# Patient Record
Sex: FEMALE | Employment: OTHER | URBAN - METROPOLITAN AREA
[De-identification: names, ages, dates, MRNs, and addresses within clinical notes are randomized per-mention and may not be internally consistent; named-entity substitution may affect disease eponyms.]

---

## 2024-03-19 PROCEDURE — 0760T DGTZ GLS MCRSCP SL IMM 1ST: CPT

## 2024-03-19 PROCEDURE — 88360 TUMOR IMMUNOHISTOCHEM/MANUAL: CPT | Performed by: PATHOLOGY

## 2024-03-19 PROCEDURE — 0763T DGTZ GLS MCRSCP MPHMTRC ALYS: CPT

## 2024-03-19 PROCEDURE — 88342 IMHCHEM/IMCYTCHM 1ST ANTB: CPT | Performed by: PATHOLOGY

## 2024-03-19 PROCEDURE — 88341 IMHCHEM/IMCYTCHM EA ADD ANTB: CPT | Performed by: PATHOLOGY

## 2024-03-19 PROCEDURE — 88341 IMHCHEM/IMCYTCHM EA ADD ANTB: CPT

## 2024-03-19 PROCEDURE — 88342 IMHCHEM/IMCYTCHM 1ST ANTB: CPT

## 2024-03-19 PROCEDURE — 0753T DGTZ GLS MCRSCP SLD LEVEL IV: CPT

## 2024-03-19 PROCEDURE — 88305 TISSUE EXAM BY PATHOLOGIST: CPT

## 2024-03-19 PROCEDURE — 88305 TISSUE EXAM BY PATHOLOGIST: CPT | Performed by: PATHOLOGY

## 2024-03-19 PROCEDURE — 88360 TUMOR IMMUNOHISTOCHEM/MANUAL: CPT

## 2024-03-20 ENCOUNTER — LAB REQUISITION (OUTPATIENT)
Dept: LAB | Facility: HOSPITAL | Age: 89
End: 2024-03-20
Payer: MEDICARE

## 2024-03-20 DIAGNOSIS — N63.11 UNSPECIFIED LUMP IN THE RIGHT BREAST, UPPER OUTER QUADRANT: ICD-10-CM

## 2024-04-02 LAB
CLINICAL SIG UPDATED INFO: NORMAL
LAB AP ASR DISCLAIMER: NORMAL
LABORATORY COMMENT REPORT: NORMAL
PATH REPORT.ADDENDUM SPEC: NORMAL
PATH REPORT.FINAL DX SPEC: NORMAL
PATH REPORT.GROSS SPEC: NORMAL
PATH REPORT.TOTAL CANCER: NORMAL

## 2024-04-10 NOTE — PROGRESS NOTES
[facility] Heart and Vascular Electrophysiology    Patient Name: La Ospina  Patient : 1929    Referred for  Syncope/pauses on monitor    La Ospina is a 89 y.o. year old female patient with    Chronic afib- not anticoagulated (has declined since diagnosed long ago)  Hypertension  CKD III  Breast cancer-testing in progress  Syncope- Patient had a fall vs syncopal episode 2024. This is was an unwitnessed fall. Patient lives with her daughter. The daughter reports she heard her mom fall. Patient was sitting on the side of the bed and ending up falling hitting her face, breaking her nose. Patient had a 14 day monitor worn -3/6/2024- Primary rhythm was atrial fib/flutter. Avg hr was 71 bpm. Minimum hr 36 bpm Max hr 140 bpm. Afib/flutter 100%. Pauses  events longest 3 sec.    Patient here with her daughters for evaluation of syncope/fall. Patient does have some memory issues. Patient and family denies any prior falls. Patient denies lightheadedness or dizziness. They are unsure how long she has been in persistent atrial fibrillation and patient does not recall ever being on OAC.     Past Medical History:  She has no past medical history on file.    Past Surgical History:  She has a past surgical history that includes Hysterectomy.      Social History:  She reports that she has never smoked. She has never used smokeless tobacco. She reports that she does not currently use alcohol. She reports that she does not use drugs.    Family History:  No family history on file.     Allergies:  Codeine    Outpatient Medications:  Current Outpatient Medications   Medication Instructions    Cartia  mg, oral, Daily    cholecalciferol, vitamin D3, (VITAMIN D3 ORAL) oral    cyanocobalamin, vitamin B-12, (VITAMIN B-12 ORAL) oral    furosemide (LASIX) 40 mg, oral, Daily    lisinopril 20 mg tablet 1 tablet, oral, Daily RT    vit C/E/zinc/lutein/zeaxanthin (OCUVITE EYE HEALTH ORAL) oral        ROS:  A 14  "point review of systems was done and is negative other than as stated in HPI    Vitals:  Vitals:    04/11/24 1055   BP: 112/76   Pulse: 78   Weight: 81.6 kg (180 lb)   Height: 1.575 m (5' 2\")       Physical Exam  Constitutional:       Appearance: Normal appearance.   Cardiovascular:      Rate and Rhythm: Normal rate. Rhythm irregular.      Pulses: Normal pulses.      Heart sounds: Normal heart sounds.   Pulmonary:      Effort: Pulmonary effort is normal.      Breath sounds: Normal breath sounds.   Musculoskeletal:         General: Normal range of motion.      Right lower leg: No edema.      Left lower leg: No edema.   Skin:     General: Skin is warm and dry.   Neurological:      Mental Status: She is alert and oriented to person, place, and time.   Psychiatric:         Mood and Affect: Mood normal.          Labs:   No results found for: \"WBC\", \"HGB\", \"HCT\", \"PLT\", \"ALT\", \"AST\", \"NA\", \"K\", \"CL\", \"CREATININE\", \"BUN\", \"CO2\", \"TSH\", \"INR\", \"GLUF\"     Cardiac Testing:  ECG  No results found for this or any previous visit (from the past 4464 hour(s)).    Monitor Feb/March 2024  Atrial fibrillation average rate of 71 bpm ()  1917 pauses with longest of 3 seconds  <1% PVC burden    Assessment:   Fall/syncope: ECG today AF with rate of 78 bpm. Monitor with average heart rate of 71 bpm with pauses, longest 3 seconds. Unclear why patient fell or if she had syncopal episode. These pauses do not seem significant enough to warrant pacing but it may be reasonable to pursue long term monitoring.  We discussed ILR for long term monitoring as monitor did not show indication for pacing at this time. Patient and family agreeable to ILR. We will have patient continue with her breast cancer workup to see if she will need further imagining prior to ILR implant. We will stop lisinopril to reduce the chances of hypotension.     Longstanding persistent atrial fibrillation: appears to be predominantly rate controlled. We discussed OAC " with elevated CHADVasc score, including risks and benefits. We would recommend starting OAC if no contraindications after evaluation by PCP as patient reports occasional rectal bleeding.     Plan:  Stop lisinopril   Recommend OAC for stroke mitigation  ILR implant

## 2024-04-10 NOTE — H&P (VIEW-ONLY)
[facility] Heart and Vascular Electrophysiology    Patient Name: La Ospina  Patient : 1929    Referred for  Syncope/pauses on monitor    La Ospina is a 89 y.o. year old female patient with    Chronic afib- not anticoagulated (has declined since diagnosed long ago)  Hypertension  CKD III  Breast cancer-testing in progress  Syncope- Patient had a fall vs syncopal episode 2024. This is was an unwitnessed fall. Patient lives with her daughter. The daughter reports she heard her mom fall. Patient was sitting on the side of the bed and ending up falling hitting her face, breaking her nose. Patient had a 14 day monitor worn -3/6/2024- Primary rhythm was atrial fib/flutter. Avg hr was 71 bpm. Minimum hr 36 bpm Max hr 140 bpm. Afib/flutter 100%. Pauses  events longest 3 sec.    Patient here with her daughters for evaluation of syncope/fall. Patient does have some memory issues. Patient and family denies any prior falls. Patient denies lightheadedness or dizziness. They are unsure how long she has been in persistent atrial fibrillation and patient does not recall ever being on OAC.     Past Medical History:  She has no past medical history on file.    Past Surgical History:  She has a past surgical history that includes Hysterectomy.      Social History:  She reports that she has never smoked. She has never used smokeless tobacco. She reports that she does not currently use alcohol. She reports that she does not use drugs.    Family History:  No family history on file.     Allergies:  Codeine    Outpatient Medications:  Current Outpatient Medications   Medication Instructions    Cartia  mg, oral, Daily    cholecalciferol, vitamin D3, (VITAMIN D3 ORAL) oral    cyanocobalamin, vitamin B-12, (VITAMIN B-12 ORAL) oral    furosemide (LASIX) 40 mg, oral, Daily    lisinopril 20 mg tablet 1 tablet, oral, Daily RT    vit C/E/zinc/lutein/zeaxanthin (OCUVITE EYE HEALTH ORAL) oral        ROS:  A 14  "point review of systems was done and is negative other than as stated in HPI    Vitals:  Vitals:    04/11/24 1055   BP: 112/76   Pulse: 78   Weight: 81.6 kg (180 lb)   Height: 1.575 m (5' 2\")       Physical Exam  Constitutional:       Appearance: Normal appearance.   Cardiovascular:      Rate and Rhythm: Normal rate. Rhythm irregular.      Pulses: Normal pulses.      Heart sounds: Normal heart sounds.   Pulmonary:      Effort: Pulmonary effort is normal.      Breath sounds: Normal breath sounds.   Musculoskeletal:         General: Normal range of motion.      Right lower leg: No edema.      Left lower leg: No edema.   Skin:     General: Skin is warm and dry.   Neurological:      Mental Status: She is alert and oriented to person, place, and time.   Psychiatric:         Mood and Affect: Mood normal.          Labs:   No results found for: \"WBC\", \"HGB\", \"HCT\", \"PLT\", \"ALT\", \"AST\", \"NA\", \"K\", \"CL\", \"CREATININE\", \"BUN\", \"CO2\", \"TSH\", \"INR\", \"GLUF\"     Cardiac Testing:  ECG  No results found for this or any previous visit (from the past 4464 hour(s)).    Monitor Feb/March 2024  Atrial fibrillation average rate of 71 bpm ()  1917 pauses with longest of 3 seconds  <1% PVC burden    Assessment:   Fall/syncope: ECG today AF with rate of 78 bpm. Monitor with average heart rate of 71 bpm with pauses, longest 3 seconds. Unclear why patient fell or if she had syncopal episode. These pauses do not seem significant enough to warrant pacing but it may be reasonable to pursue long term monitoring.  We discussed ILR for long term monitoring as monitor did not show indication for pacing at this time. Patient and family agreeable to ILR. We will have patient continue with her breast cancer workup to see if she will need further imagining prior to ILR implant. We will stop lisinopril to reduce the chances of hypotension.     Longstanding persistent atrial fibrillation: appears to be predominantly rate controlled. We discussed OAC " with elevated CHADVasc score, including risks and benefits. We would recommend starting OAC if no contraindications after evaluation by PCP as patient reports occasional rectal bleeding.     Plan:  Stop lisinopril   Recommend OAC for stroke mitigation  ILR implant

## 2024-04-11 ENCOUNTER — OFFICE VISIT (OUTPATIENT)
Dept: CARDIOLOGY | Facility: CLINIC | Age: 89
End: 2024-04-11
Payer: MEDICARE

## 2024-04-11 VITALS
HEIGHT: 62 IN | DIASTOLIC BLOOD PRESSURE: 76 MMHG | HEART RATE: 78 BPM | WEIGHT: 180 LBS | SYSTOLIC BLOOD PRESSURE: 112 MMHG | BODY MASS INDEX: 33.13 KG/M2

## 2024-04-11 DIAGNOSIS — I48.20 CHRONIC ATRIAL FIBRILLATION WITH RVR (MULTI): Primary | ICD-10-CM

## 2024-04-11 PROBLEM — S02.2XXA NASAL BONE FRACTURE: Status: ACTIVE | Noted: 2024-02-12

## 2024-04-11 PROBLEM — N63.11 MASS OF UPPER OUTER QUADRANT OF RIGHT BREAST: Status: ACTIVE | Noted: 2024-02-11
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
## 2024-04-11 PROBLEM — E87.6 HYPOKALEMIA: Status: ACTIVE | Noted: 2024-01-01

## 2024-04-11 PROBLEM — N63.0 BREAST MASS: Status: ACTIVE | Noted: 2024-02-11

## 2024-04-11 PROBLEM — E78.2 MIXED HYPERLIPIDEMIA: Status: ACTIVE | Noted: 2023-12-11

## 2024-04-11 PROCEDURE — 99204 OFFICE O/P NEW MOD 45 MIN: CPT | Performed by: INTERNAL MEDICINE

## 2024-04-11 PROCEDURE — 1036F TOBACCO NON-USER: CPT | Performed by: INTERNAL MEDICINE

## 2024-04-11 PROCEDURE — 93000 ELECTROCARDIOGRAM COMPLETE: CPT | Performed by: INTERNAL MEDICINE

## 2024-04-11 PROCEDURE — 1159F MED LIST DOCD IN RCRD: CPT | Performed by: INTERNAL MEDICINE

## 2024-04-11 RX ORDER — LISINOPRIL 20 MG/1
1 TABLET ORAL
COMMUNITY
End: 2024-04-11 | Stop reason: ALTCHOICE

## 2024-04-11 RX ORDER — DILTIAZEM HYDROCHLORIDE 300 MG/1
300 CAPSULE, EXTENDED RELEASE ORAL DAILY
COMMUNITY

## 2024-04-11 RX ORDER — FUROSEMIDE 40 MG/1
40 TABLET ORAL DAILY
COMMUNITY

## 2024-04-12 DIAGNOSIS — R55 SYNCOPE AND COLLAPSE: ICD-10-CM

## 2024-04-12 DIAGNOSIS — I48.20 CHRONIC ATRIAL FIBRILLATION WITH RVR (MULTI): Primary | ICD-10-CM

## 2024-04-29 ENCOUNTER — TELEPHONE (OUTPATIENT)
Dept: CARDIOLOGY | Facility: CLINIC | Age: 89
End: 2024-04-29
Payer: MEDICARE

## 2024-04-29 NOTE — TELEPHONE ENCOUNTER
Patient is scheduled for an ILR with Dr. Johnson 5/6/24 at [place] with arrival time of 11:00. No labs required. A light breakfast is OK. Patient's daughter, Rosa Isela, verbalized understanding. All questions answered.

## 2024-05-06 ENCOUNTER — HOSPITAL ENCOUNTER (OUTPATIENT)
Dept: CARDIOLOGY | Facility: HOSPITAL | Age: 89
Discharge: HOME | End: 2024-05-06
Payer: MEDICARE

## 2024-05-06 VITALS
WEIGHT: 179.9 LBS | SYSTOLIC BLOOD PRESSURE: 163 MMHG | HEART RATE: 84 BPM | BODY MASS INDEX: 33.1 KG/M2 | RESPIRATION RATE: 20 BRPM | TEMPERATURE: 97.4 F | DIASTOLIC BLOOD PRESSURE: 85 MMHG | HEIGHT: 62 IN | OXYGEN SATURATION: 95 %

## 2024-05-06 DIAGNOSIS — I48.20 CHRONIC ATRIAL FIBRILLATION WITH RVR (MULTI): ICD-10-CM

## 2024-05-06 DIAGNOSIS — R55 SYNCOPE AND COLLAPSE: ICD-10-CM

## 2024-05-06 PROCEDURE — 33285 INSJ SUBQ CAR RHYTHM MNTR: CPT | Performed by: INTERNAL MEDICINE

## 2024-05-06 PROCEDURE — C1764 EVENT RECORDER, CARDIAC: HCPCS

## 2024-05-06 PROCEDURE — 7100000009 HC PHASE TWO TIME - INITIAL BASE CHARGE

## 2024-05-06 PROCEDURE — 33285 INSJ SUBQ CAR RHYTHM MNTR: CPT

## 2024-05-06 PROCEDURE — 2780000003 HC OR 278 NO HCPCS

## 2024-05-06 PROCEDURE — 2500000005 HC RX 250 GENERAL PHARMACY W/O HCPCS: Performed by: INTERNAL MEDICINE

## 2024-05-06 RX ORDER — LIDOCAINE HYDROCHLORIDE 10 MG/ML
INJECTION, SOLUTION EPIDURAL; INFILTRATION; INTRACAUDAL; PERINEURAL AS NEEDED
Status: DISCONTINUED | OUTPATIENT
Start: 2024-05-06 | End: 2024-05-07 | Stop reason: HOSPADM

## 2024-05-06 RX ADMIN — LIDOCAINE HYDROCHLORIDE 10 ML: 10 INJECTION, SOLUTION EPIDURAL; INFILTRATION; INTRACAUDAL; PERINEURAL at 12:25

## 2024-05-06 RX ADMIN — LIDOCAINE HYDROCHLORIDE 10 ML: 10 INJECTION, SOLUTION EPIDURAL; INFILTRATION; INTRACAUDAL; PERINEURAL at 12:20

## 2024-05-06 ASSESSMENT — COLUMBIA-SUICIDE SEVERITY RATING SCALE - C-SSRS
2. HAVE YOU ACTUALLY HAD ANY THOUGHTS OF KILLING YOURSELF?: NO
1. IN THE PAST MONTH, HAVE YOU WISHED YOU WERE DEAD OR WISHED YOU COULD GO TO SLEEP AND NOT WAKE UP?: NO
6. HAVE YOU EVER DONE ANYTHING, STARTED TO DO ANYTHING, OR PREPARED TO DO ANYTHING TO END YOUR LIFE?: NO

## 2024-05-06 ASSESSMENT — PAIN SCALES - GENERAL
PAINLEVEL_OUTOF10: 0 - NO PAIN
PAINLEVEL_OUTOF10: 0 - NO PAIN

## 2024-05-06 ASSESSMENT — PAIN - FUNCTIONAL ASSESSMENT
PAIN_FUNCTIONAL_ASSESSMENT: 0-10
PAIN_FUNCTIONAL_ASSESSMENT: 0-10

## 2024-05-06 NOTE — PRE-SEDATION DOCUMENTATION
"EP Preprocedure Note    La Ospina   Indication for procedure: Diagnoses of Chronic atrial fibrillation with RVR (Multi) and Syncope and collapse were pertinent to this visit.    Relevant review of systems: right breast pain       BP (!) 177/98   Pulse 81   Temp 36.3 °C (97.4 °F) (Temporal)   Resp 22   Ht 1.575 m (5' 2.01\")   Wt 81.6 kg (179 lb 14.3 oz)   SpO2 97%   BMI 32.89 kg/m²    Relevant Labs:   No results found for: \"CREATININE\", \"EGFR\", \"PTT\", \"INR\", \"PROTIME\", \"PREGTESTUR\"    Planned Sedation/Anesthesia: local anesthetic     Airway assessment: normal    Physical Exam  Constitutional:       Appearance: Normal appearance.   HENT:      Ears:      Comments: Hard of hearing      Nose: Nose normal.      Mouth/Throat:      Mouth: Mucous membranes are moist.   Cardiovascular:      Rate and Rhythm: Normal rate. Rhythm irregular.      Pulses: Normal pulses.   Pulmonary:      Effort: Pulmonary effort is normal.      Breath sounds: Normal breath sounds.   Skin:     General: Skin is warm and dry.      Capillary Refill: Capillary refill takes less than 2 seconds.   Neurological:      General: No focal deficit present.      Mental Status: She is alert.         Mallampati:  NA    ASA Score: ASA 3 - Patient with moderate systemic disease with functional limitations    Benefits, risks and alternatives of procedure and planned sedation have been discussed with the patient and/or their representative. All questions answered and they agree to proceed.     Anupama Jenkins, APRN-CNP   "

## 2024-05-06 NOTE — Clinical Note
Patient ID band present and verified. Patient contact is in waiting room. Contact(s) present: daughters.

## 2024-05-07 LAB — BODY SURFACE AREA: 1.89 M2

## 2024-05-15 ENCOUNTER — HOSPITAL ENCOUNTER (OUTPATIENT)
Dept: CARDIOLOGY | Facility: HOSPITAL | Age: 89
Discharge: HOME | End: 2024-05-15
Payer: MEDICARE

## 2024-05-15 DIAGNOSIS — I48.0 PAROXYSMAL ATRIAL FIBRILLATION (MULTI): ICD-10-CM

## 2024-05-15 DIAGNOSIS — R55 SYNCOPE AND COLLAPSE: ICD-10-CM

## 2024-05-15 DIAGNOSIS — R55 SYNCOPE AND COLLAPSE: Primary | ICD-10-CM

## 2024-05-15 DIAGNOSIS — I48.20 CHRONIC ATRIAL FIBRILLATION WITH RVR (MULTI): ICD-10-CM

## 2024-05-15 PROCEDURE — 93298 REM INTERROG DEV EVAL SCRMS: CPT

## 2024-05-16 ENCOUNTER — HOSPITAL ENCOUNTER (OUTPATIENT)
Dept: CARDIOLOGY | Facility: HOSPITAL | Age: 89
Discharge: HOME | End: 2024-05-16
Payer: MEDICARE

## 2024-05-16 DIAGNOSIS — R55 SYNCOPE AND COLLAPSE: ICD-10-CM

## 2024-05-16 DIAGNOSIS — I48.20 CHRONIC ATRIAL FIBRILLATION WITH RVR (MULTI): ICD-10-CM

## 2024-05-21 ENCOUNTER — HOSPITAL ENCOUNTER (OUTPATIENT)
Dept: CARDIOLOGY | Facility: HOSPITAL | Age: 89
Discharge: HOME | End: 2024-05-21
Payer: MEDICARE

## 2024-05-21 DIAGNOSIS — R55 SYNCOPE AND COLLAPSE: ICD-10-CM

## 2024-05-21 DIAGNOSIS — I48.20 CHRONIC ATRIAL FIBRILLATION WITH RVR (MULTI): ICD-10-CM

## 2024-06-04 ENCOUNTER — HOSPITAL ENCOUNTER (OUTPATIENT)
Dept: CARDIOLOGY | Facility: HOSPITAL | Age: 89
Discharge: HOME | End: 2024-06-04
Payer: MEDICARE

## 2024-06-04 DIAGNOSIS — R55 SYNCOPE AND COLLAPSE: ICD-10-CM

## 2024-06-04 DIAGNOSIS — I48.20 CHRONIC ATRIAL FIBRILLATION WITH RVR (MULTI): ICD-10-CM

## 2024-08-08 ENCOUNTER — APPOINTMENT (OUTPATIENT)
Dept: CARDIOLOGY | Facility: HOSPITAL | Age: 89
End: 2024-08-08
Payer: MEDICARE

## 2024-08-22 ENCOUNTER — APPOINTMENT (OUTPATIENT)
Dept: CARDIOLOGY | Facility: HOSPITAL | Age: 89
End: 2024-08-22
Payer: MEDICARE